# Patient Record
Sex: FEMALE | Race: BLACK OR AFRICAN AMERICAN
[De-identification: names, ages, dates, MRNs, and addresses within clinical notes are randomized per-mention and may not be internally consistent; named-entity substitution may affect disease eponyms.]

---

## 2019-09-19 ENCOUNTER — HOSPITAL ENCOUNTER (OUTPATIENT)
Dept: HOSPITAL 74 - FASU-ENDO | Age: 79
Discharge: HOME | End: 2019-09-19
Attending: INTERNAL MEDICINE
Payer: COMMERCIAL

## 2019-09-19 VITALS — SYSTOLIC BLOOD PRESSURE: 137 MMHG | DIASTOLIC BLOOD PRESSURE: 75 MMHG | HEART RATE: 62 BPM

## 2019-09-19 VITALS — TEMPERATURE: 98 F

## 2019-09-19 VITALS — BODY MASS INDEX: 33.3 KG/M2

## 2019-09-19 DIAGNOSIS — K31.7: ICD-10-CM

## 2019-09-19 DIAGNOSIS — R10.13: ICD-10-CM

## 2019-09-19 DIAGNOSIS — K29.80: ICD-10-CM

## 2019-09-19 DIAGNOSIS — K29.50: Primary | ICD-10-CM

## 2019-09-19 PROCEDURE — 0DB68ZX EXCISION OF STOMACH, VIA NATURAL OR ARTIFICIAL OPENING ENDOSCOPIC, DIAGNOSTIC: ICD-10-PCS | Performed by: INTERNAL MEDICINE

## 2019-09-25 NOTE — PATH
Surgical Pathology Report



Patient Name:  STEFFI DE LA ROSA

Accession #:  [id number]

Med. Rec. #:  [id number]                                                        

   /Age/Gender:  1940 (Age: 79) / M

Account:  [id number]                                                          

             Location: [facility]

Taken:  2019

Received:  2019

Reported:  2019

Physicians:  Case Mann M.D.

  



Specimen(s) Received

A: GASTRIC ANTRUM 

B: POLYP GASTRIC FUNDUS 





Clinical History

Gastritis, gastric polyp







Final Diagnosis

A. GASTRIC ANTRUM, BIOPSY:

MILD CHRONIC GASTRITIS WITH FEATURES OF REACTIVE GASTROPATHY.

IMMUNOSTAIN IS NEGATIVE FOR H. PYLORI ORGANISMS.



B. GASTRIC FUNDUS, POLYP, BIOPSY:

MILD CHRONIC GASTRITIS.

IMMUNOSTAIN IS NEGATIVE FOR H. PYLORI ORGANISMS.







***Electronically Signed***

Josselin Wong M.D.





Gross Description

A.  Received in formalin, labeled "gastric antrum" are 2 tan, irregular portions

of soft tissue each measuring 0.3 cm. in greatest dimension. The specimens are

submitted in toto in one cassette.



B.  Received in formalin, labeled "polyp, gastric fundus" is a tan, irregular

portion of soft tissue measuring 0.3 cm. in greatest dimension. The specimen is

submitted in toto in one cassette.

AE/2019



ebram/[email or web address]

## 2022-02-10 ENCOUNTER — HOSPITAL ENCOUNTER (OUTPATIENT)
Dept: HOSPITAL 74 - FASU-ENDO | Age: 82
Discharge: HOME | End: 2022-02-10
Attending: INTERNAL MEDICINE
Payer: COMMERCIAL

## 2022-02-10 VITALS — BODY MASS INDEX: 32.3 KG/M2

## 2022-02-10 VITALS — TEMPERATURE: 97.3 F | DIASTOLIC BLOOD PRESSURE: 79 MMHG | HEART RATE: 78 BPM | SYSTOLIC BLOOD PRESSURE: 110 MMHG

## 2022-02-10 DIAGNOSIS — R11.0: ICD-10-CM

## 2022-02-10 DIAGNOSIS — K29.50: Primary | ICD-10-CM

## 2022-02-10 PROCEDURE — 0DB98ZX EXCISION OF DUODENUM, VIA NATURAL OR ARTIFICIAL OPENING ENDOSCOPIC, DIAGNOSTIC: ICD-10-PCS | Performed by: INTERNAL MEDICINE

## 2022-02-10 PROCEDURE — 0DB68ZX EXCISION OF STOMACH, VIA NATURAL OR ARTIFICIAL OPENING ENDOSCOPIC, DIAGNOSTIC: ICD-10-PCS | Performed by: INTERNAL MEDICINE
